# Patient Record
Sex: MALE | URBAN - NONMETROPOLITAN AREA
[De-identification: names, ages, dates, MRNs, and addresses within clinical notes are randomized per-mention and may not be internally consistent; named-entity substitution may affect disease eponyms.]

---

## 2021-03-01 ENCOUNTER — TELEPHONE (OUTPATIENT)
Dept: GASTROENTEROLOGY | Facility: CLINIC | Age: 56
End: 2021-03-01

## 2021-03-01 NOTE — TELEPHONE ENCOUNTER
PT IS DUE FOR A REPEAT COLONOSCOPY. 2 RECALL LETTERS HAVE BEEN SENT TO ADDRESS ON FILE WITH NO RESPONSE. THERE IS NO PHONE NUMBER LISTED AND NO PCP.